# Patient Record
(demographics unavailable — no encounter records)

---

## 2025-06-03 NOTE — PHYSICAL EXAM
[FreeTextEntry1] : General: Well-developed female in no apparent distress.  Patient is awake, alert, and oriented x3.  Cooperative. HEENT: Normocephalic, atraumatic.  MMM. Extremities: No cyanosis, clubbing, or edema noted in the right lower extremity.  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  No skin breakdown noted, some erythema noted on the TT and distal tibia.  No tenderness to palpation.  No skin adhesions.  Full active range of motion at the knee.  Motor: Both upper extremities: Tone normal, active range of motion within functional limits with 5/5 motor power throughout.  Thumb to digit opposition intact bilaterally Both lower extremities: Tone normal, active range of motion within functional limits with 5/5 motor power throughout.  Sensory: Intact to light touch in both lower extremities.  Functional status: Sit to stand transfer independent.  Patient ambulated independently without an assistive device with a left BK prosthesis with elevated vacuum suspension, 3 ply sock.  Patient ambulates with the knee in slight flexion throughout stance phase with mild lateral thrusting.  Patient is a K3 ambulator.

## 2025-06-03 NOTE — REVIEW OF SYSTEMS
[Difficulty Walking] : difficulty walking [Negative] : Gastrointestinal [Fever] : no fever [Recent Change In Weight] : ~T no recent weight change [Incontinence] : no incontinence [Muscle Weakness] : no muscle weakness

## 2025-06-03 NOTE — HISTORY OF PRESENT ILLNESS
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  The patient's current left BK prosthesis is approximately 2-1/2 years old.  Patient's main complaint is that the prosthetic socket is large causing discomfort at the distal tibia during ambulation with episodic skin breakdown.  Patient also notes some instability at the knee during ambulation.  Patient is currently using 3 ply socks and finds that getting additional socks causes discomfort.  Patient states that the prosthetic foot outer covering is torn at the heel and also has noted that the prosthetic toes are becoming excessively extended.  No falls reported, patient does report approximately 7 pound weight loss over the past 6 months.  Patient does note episodes of phantom pain described as a tingling/stabbing pain at the foot, pain score approximately 4/10.  Patient is never taking gabapentin or Lyrica in the past.  Functionally the patient is an independent community ambulator without an assistive device and independent in all transfers and activities of daily living.  Patient is able to negotiate all environmental barriers such as curbs and ramps.  Patient is independent on stairs and participates in an exercise program including treadmill and light weight lifting.  Patient lives with her  and 3 children in a private house with 5 step entry and 1 flight of steps to negotiate to the bedroom and bathroom.  No home health aide is present.  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

## 2025-06-03 NOTE — ASSESSMENT
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  Patient's prosthetic socket has been revised by the  several times and requires a socket replacement to improve prosthetic fit, comfort and safety of ambulation.  Patient's prosthetic foot is worn, delaminating and requires replacement.  In addition the patient's silicone liners are stretched and worn, prosthetic socks are frayed requiring replacement.  Patient is a K3 ambulator.  Prescription provided for a left custom molded below-knee endoskeletal prosthesis, DX socket x2, total contact, flexible inner suction socket, silicone liners x2, sealing sleeves x3, elevated vacuum pump with volume management, alignable system, ultralight materials, flexible protective outer covering, prosthetic sheaths x10, single ply socks x10, prosthetic socks x10, Flexfoot system.  Prescription provided for gabapentin 100 mg p.o. twice daily to address phantom pain.  Side effects and drug interactions reviewed.  Patient can follow-up with a telephonic visit in terms of assessing further titration upward.  I spent a total of 30 minutes on the date of the encounter evaluating and treating the patient including a discussion of treatment options.  My time excludes teaching and/or procedures.